# Patient Record
Sex: FEMALE | Race: WHITE | NOT HISPANIC OR LATINO | URBAN - METROPOLITAN AREA
[De-identification: names, ages, dates, MRNs, and addresses within clinical notes are randomized per-mention and may not be internally consistent; named-entity substitution may affect disease eponyms.]

---

## 2017-03-09 ENCOUNTER — EMERGENCY (EMERGENCY)
Facility: HOSPITAL | Age: 72
LOS: 1 days | End: 2017-03-09
Payer: MEDICARE

## 2017-03-09 PROCEDURE — 71010: CPT | Mod: 26

## 2017-03-09 PROCEDURE — 99285 EMERGENCY DEPT VISIT HI MDM: CPT

## 2017-03-15 PROBLEM — Z00.00 ENCOUNTER FOR PREVENTIVE HEALTH EXAMINATION: Status: ACTIVE | Noted: 2017-03-15

## 2017-03-23 ENCOUNTER — APPOINTMENT (OUTPATIENT)
Dept: CARDIOLOGY | Facility: CLINIC | Age: 72
End: 2017-03-23

## 2017-03-30 ENCOUNTER — APPOINTMENT (OUTPATIENT)
Dept: CARDIOLOGY | Facility: CLINIC | Age: 72
End: 2017-03-30

## 2017-04-19 ENCOUNTER — APPOINTMENT (OUTPATIENT)
Dept: CARDIOLOGY | Facility: CLINIC | Age: 72
End: 2017-04-19

## 2017-04-24 ENCOUNTER — INPATIENT (INPATIENT)
Facility: HOSPITAL | Age: 72
LOS: 0 days | Discharge: HOME | DRG: 247 | End: 2017-04-25
Attending: INTERNAL MEDICINE | Admitting: INTERNAL MEDICINE
Payer: MEDICARE

## 2017-04-24 VITALS — SYSTOLIC BLOOD PRESSURE: 168 MMHG | DIASTOLIC BLOOD PRESSURE: 90 MMHG | HEART RATE: 78 BPM

## 2017-04-24 DIAGNOSIS — R94.39 ABNORMAL RESULT OF OTHER CARDIOVASCULAR FUNCTION STUDY: ICD-10-CM

## 2017-04-24 DIAGNOSIS — I25.10 ATHEROSCLEROTIC HEART DISEASE OF NATIVE CORONARY ARTERY WITHOUT ANGINA PECTORIS: ICD-10-CM

## 2017-04-24 PROCEDURE — 93458 L HRT ARTERY/VENTRICLE ANGIO: CPT | Mod: 26,XU

## 2017-04-24 PROCEDURE — 93010 ELECTROCARDIOGRAM REPORT: CPT | Mod: 76

## 2017-04-24 PROCEDURE — 92928 PRQ TCAT PLMT NTRAC ST 1 LES: CPT | Mod: RC

## 2017-04-24 RX ORDER — ASPIRIN/CALCIUM CARB/MAGNESIUM 324 MG
81 TABLET ORAL DAILY
Qty: 0 | Refills: 0 | Status: DISCONTINUED | OUTPATIENT
Start: 2017-04-24 | End: 2017-04-25

## 2017-04-24 RX ORDER — TICAGRELOR 90 MG/1
90 TABLET ORAL
Qty: 0 | Refills: 0 | Status: DISCONTINUED | OUTPATIENT
Start: 2017-04-24 | End: 2017-04-25

## 2017-04-24 RX ORDER — LACTOBACILLUS ACIDOPHILUS 100MM CELL
2 CAPSULE ORAL
Qty: 0 | Refills: 0 | COMMUNITY

## 2017-04-24 RX ORDER — SODIUM CHLORIDE 9 MG/ML
500 INJECTION INTRAMUSCULAR; INTRAVENOUS; SUBCUTANEOUS
Qty: 0 | Refills: 0 | Status: DISCONTINUED | OUTPATIENT
Start: 2017-04-24 | End: 2017-04-24

## 2017-04-24 RX ORDER — SODIUM CHLORIDE 9 MG/ML
1000 INJECTION INTRAMUSCULAR; INTRAVENOUS; SUBCUTANEOUS
Qty: 0 | Refills: 0 | Status: DISCONTINUED | OUTPATIENT
Start: 2017-04-24 | End: 2017-04-25

## 2017-04-24 RX ORDER — ZOLPIDEM TARTRATE 10 MG/1
5 TABLET ORAL AT BEDTIME
Qty: 0 | Refills: 0 | Status: DISCONTINUED | OUTPATIENT
Start: 2017-04-24 | End: 2017-04-25

## 2017-04-24 RX ORDER — ATORVASTATIN CALCIUM 80 MG/1
1 TABLET, FILM COATED ORAL
Qty: 0 | Refills: 0 | COMMUNITY

## 2017-04-24 RX ORDER — ASPIRIN/CALCIUM CARB/MAGNESIUM 324 MG
1 TABLET ORAL
Qty: 0 | Refills: 0 | COMMUNITY

## 2017-04-24 RX ORDER — METOPROLOL TARTRATE 50 MG
1 TABLET ORAL
Qty: 0 | Refills: 0 | COMMUNITY

## 2017-04-24 RX ORDER — CHOLECALCIFEROL (VITAMIN D3) 125 MCG
1 CAPSULE ORAL
Qty: 0 | Refills: 0 | COMMUNITY

## 2017-04-24 RX ORDER — UBIDECARENONE 100 MG
1 CAPSULE ORAL
Qty: 0 | Refills: 0 | COMMUNITY

## 2017-04-24 RX ORDER — ATORVASTATIN CALCIUM 80 MG/1
20 TABLET, FILM COATED ORAL AT BEDTIME
Qty: 0 | Refills: 0 | Status: DISCONTINUED | OUTPATIENT
Start: 2017-04-24 | End: 2017-04-25

## 2017-04-24 RX ORDER — METOPROLOL TARTRATE 50 MG
50 TABLET ORAL DAILY
Qty: 0 | Refills: 0 | Status: DISCONTINUED | OUTPATIENT
Start: 2017-04-24 | End: 2017-04-25

## 2017-04-24 RX ORDER — ACETAMINOPHEN 500 MG
650 TABLET ORAL EVERY 6 HOURS
Qty: 0 | Refills: 0 | Status: DISCONTINUED | OUTPATIENT
Start: 2017-04-24 | End: 2017-04-25

## 2017-04-24 RX ORDER — ALPRAZOLAM 0.25 MG
0.25 TABLET ORAL EVERY 6 HOURS
Qty: 0 | Refills: 0 | Status: DISCONTINUED | OUTPATIENT
Start: 2017-04-24 | End: 2017-04-25

## 2017-04-24 RX ORDER — CLOPIDOGREL BISULFATE 75 MG/1
1 TABLET, FILM COATED ORAL
Qty: 0 | Refills: 0 | COMMUNITY

## 2017-04-24 RX ADMIN — ATORVASTATIN CALCIUM 20 MILLIGRAM(S): 80 TABLET, FILM COATED ORAL at 21:12

## 2017-04-24 RX ADMIN — TICAGRELOR 90 MILLIGRAM(S): 90 TABLET ORAL at 21:13

## 2017-04-24 NOTE — DISCHARGE NOTE ADULT - CARE PROVIDER_API CALL
Sebastian Marrero), Cardiovascular Disease; Interventional Cardiology  [address] [place] [address]  Phone: [phone]  Fax: [phone]

## 2017-04-24 NOTE — PROGRESS NOTE ADULT - PROBLEM SELECTOR PLAN 1
D/C Plavix  Begin Brilinta 90mg po 2x daily  ASA 81 mg po Daily  AM labs/ECG  RRA site care w/instructions to pt  F/U Dr. Marrero outpt.  Aggressive CV risk factor reduction measures reviewed with patient via patient teaching

## 2017-04-24 NOTE — H&P PST ADULT - FAMILY HISTORY
Father  Still living? No  Family history of heart attack, Age at diagnosis: 71-80     Sibling  Still living? Yes, Estimated age: 61-70  Family history of hyperlipidemia, Age at diagnosis: Age Unknown

## 2017-04-24 NOTE — DISCHARGE NOTE ADULT - HOSPITAL COURSE
s/p LHC RRA s/p LHC RRA w/PCI X1 DC mRCA for 99% s/p [facility] RRA w/PCI X1 DC mRCA for 99%    72 y/o female with no cardiac history who had an episode about a month ago where she was not "feeling well" for over a day, checked her bp at home and noted that it was "very high." Pt states she went to a walk-in clinic and was sent to the ER, treated with clonidine and was discharged home. She followed up with PMD who started her on metoprolol and referred her to cardiologist. She had an abnormal stress echo last week which showed normal LV, hypokineses of the lateral, anterior and inferior walls. She was started on plavix 75 mg daily and referred for a cardiac catheterization. Pt denies any chest pain, SOB, dizziness or syncope.     s/p [facility] 4/24/17 with PCI to RCA admitted overnight to tele.  no events on monitor, AM labs OK, AM EKG no changes, Patient denies any complaints overnight.  Stable for discharge home with out patient follow up.

## 2017-04-24 NOTE — DISCHARGE NOTE ADULT - PLAN OF CARE
Restricted use with no heavy lifting of affected arm for 48 hours.  No submerging the arm in water for 48 hours.  You may start showering today.  Call your doctor for any bleeding, swelling, loss of sensation in the hand or fingers, or fingers turning blue.  If heavy bleeding or large lumps form, hold pressure at the spot and come to the Emergency Room.  REMOVE RIGHT RADIAL DRESSING ON 4/25/2017 optimal cardiac function

## 2017-04-24 NOTE — H&P PST ADULT - HISTORY OF PRESENT ILLNESS
72 y/o female with no cardiac history who had an episode about a month ago where she was not "feeling well" for over a day, checked her bp at home and noted that it was "very high." Pt states she went to a walk-in clinic and was sent to the ER, treated with clonidine and was discharged home. She followed up with PMD who started her on metoprolol and referred her to cardiologist. She had an abnormal stress echo last week which showed normal LV, hypokineses of the lateral, anterior and inferior walls. She was started on plavix 75 mg daily and referred for a cardiac catheterization. Pt denies any chest pain, SOB, dizziness or syncope.

## 2017-04-24 NOTE — PROGRESS NOTE ADULT - SUBJECTIVE AND OBJECTIVE BOX
S/P LHC RRA w/PCI mRCA for 99% occlusion  Tolerated procedre well and seen post procedure by Dr. garcia w/spouse present  No complications  Centricity pending    REVIEW OF SYSTEMS:  Denies SOB, CP, NV, HA, dizziness, palpitations, site pain  PHYSICAL EXAM: A&Ox3 NAD Skin warm and dry  NEURO: Speech intact +gag +swallow Tongue midline YORK  NECK: No JVD, trachea midline. Eupneic  HEART: RRR S1S2 no g/m Tele/ECG NSR no ectopy  PULMONARY:  CTA [place]  ABDOMEN: Soft nontender X4 +BS   EXTREMITIES: Rt Radial site: Rt radial pulse + w/pulse ox on right index finger SaO2>95% RUE w/oneurovascular deficit. Capillary refill <3 sec

## 2017-04-24 NOTE — DISCHARGE NOTE ADULT - MEDICATION SUMMARY - MEDICATIONS TO STOP TAKING
I will STOP taking the medications listed below when I get home from the hospital:    clopidogrel 75 mg oral tablet  -- 1 tab(s) by mouth once a day

## 2017-04-24 NOTE — DISCHARGE NOTE ADULT - PATIENT PORTAL LINK FT
“You can access the [facility] Patient Portal, offered by [facility], by registering with the following website: [email or web address]”

## 2017-04-24 NOTE — DISCHARGE NOTE ADULT - MEDICATION SUMMARY - MEDICATIONS TO TAKE
I will START or STAY ON the medications listed below when I get home from the hospital:    Aspir 81 oral delayed release tablet  -- 1 tab(s) by mouth once a day  -- Indication: For CAD (coronary artery disease)    atorvastatin 20 mg oral tablet  -- 1 tab(s) by mouth once a day  -- Indication: For Cholestrol    ticagrelor 90 mg oral tablet  -- 1 tab(s) by mouth 2 times a day  -- Indication: For CAD (coronary artery disease)    metoprolol succinate 50 mg oral tablet, extended release  -- 1 tab(s) by mouth once a day  -- Indication: For CAD (coronary artery disease)    CoQ10 300 mg oral capsule  -- 1 cap(s) by mouth once a day  -- Indication: For supplement    Acidophilus oral tablet  -- 2 tab(s) by mouth once a day  -- Indication: For supplement    B 100 Complex oral tablet  -- 1 tab(s) by mouth once a day  -- Indication: For supplement    multivitamin  -- 1 tab(s) by mouth once a day  -- Indication: For supplement    Vitamin D3 2000 intl units oral tablet  -- 1 tab(s) by mouth once a day  -- Indication: For supplement

## 2017-04-24 NOTE — CHART NOTE - NSCHARTNOTEFT_GEN_A_CORE
Cards ATTG    Hx HTN and Hypercholesteremia.    Pt. w AMANDA mod to severe exertion.  Exercise echocardiography w ischemic ecg response and multiple territories of ischemia at low work load.  Risks/Benefits of Angiography, PCI reviewed, Pt. wished to proceed.    R Radial Allens Normal    LM Irregs  LAD ostial 40 mid 20  LCX 2.25 mm mid 90 with post stenotic dililation  RCA mid 99    LV IW hypo EF 55    RCA:  3.5 x 18 mm Resolute DC to 0 Percent.    R Radial Band w out complication.    Brilinta 180 given.    OK for d/c tomorrow on asa 81 daily and brilinta 90 BID.  See me one week.  Feel that LCX best treated medically.

## 2017-04-25 LAB
ANION GAP SERPL CALC-SCNC: 14 MMOL/L — SIGNIFICANT CHANGE UP (ref 5–17)
BUN SERPL-MCNC: 17 MG/DL — SIGNIFICANT CHANGE UP (ref 8–20)
CALCIUM SERPL-MCNC: 9.3 MG/DL — SIGNIFICANT CHANGE UP (ref 8.6–10.2)
CHLORIDE SERPL-SCNC: 103 MMOL/L — SIGNIFICANT CHANGE UP (ref 98–107)
CO2 SERPL-SCNC: 23 MMOL/L — SIGNIFICANT CHANGE UP (ref 22–29)
CREAT SERPL-MCNC: 0.61 MG/DL — SIGNIFICANT CHANGE UP (ref 0.5–1.3)
GLUCOSE SERPL-MCNC: 98 MG/DL — SIGNIFICANT CHANGE UP (ref 70–115)
HCT VFR BLD CALC: 40.8 % — SIGNIFICANT CHANGE UP (ref 37–47)
HGB BLD-MCNC: 14 G/DL — SIGNIFICANT CHANGE UP (ref 12–16)
MAGNESIUM SERPL-MCNC: 2.1 MG/DL — SIGNIFICANT CHANGE UP (ref 1.8–2.5)
MCHC RBC-ENTMCNC: 29.6 PG — SIGNIFICANT CHANGE UP (ref 27–31)
MCHC RBC-ENTMCNC: 34.3 G/DL — SIGNIFICANT CHANGE UP (ref 32–36)
MCV RBC AUTO: 86.3 FL — SIGNIFICANT CHANGE UP (ref 81–99)
PLATELET # BLD AUTO: 348 K/UL — SIGNIFICANT CHANGE UP (ref 150–400)
POTASSIUM SERPL-MCNC: 3.6 MMOL/L — SIGNIFICANT CHANGE UP (ref 3.5–5.3)
POTASSIUM SERPL-SCNC: 3.6 MMOL/L — SIGNIFICANT CHANGE UP (ref 3.5–5.3)
RBC # BLD: 4.73 M/UL — SIGNIFICANT CHANGE UP (ref 4.4–5.2)
RBC # FLD: 13.5 % — SIGNIFICANT CHANGE UP (ref 11–15.6)
SODIUM SERPL-SCNC: 140 MMOL/L — SIGNIFICANT CHANGE UP (ref 135–145)
WBC # BLD: 8.2 K/UL — SIGNIFICANT CHANGE UP (ref 4.8–10.8)
WBC # FLD AUTO: 8.2 K/UL — SIGNIFICANT CHANGE UP (ref 4.8–10.8)

## 2017-04-25 PROCEDURE — C9600: CPT | Mod: RC

## 2017-04-25 PROCEDURE — C1894: CPT

## 2017-04-25 PROCEDURE — 80048 BASIC METABOLIC PNL TOTAL CA: CPT

## 2017-04-25 PROCEDURE — 83735 ASSAY OF MAGNESIUM: CPT

## 2017-04-25 PROCEDURE — C1874: CPT

## 2017-04-25 PROCEDURE — 93010 ELECTROCARDIOGRAM REPORT: CPT

## 2017-04-25 PROCEDURE — 36415 COLL VENOUS BLD VENIPUNCTURE: CPT

## 2017-04-25 PROCEDURE — C1769: CPT

## 2017-04-25 PROCEDURE — C1887: CPT

## 2017-04-25 PROCEDURE — 85027 COMPLETE CBC AUTOMATED: CPT

## 2017-04-25 PROCEDURE — 93005 ELECTROCARDIOGRAM TRACING: CPT

## 2017-04-25 PROCEDURE — 93458 L HRT ARTERY/VENTRICLE ANGIO: CPT | Mod: XU

## 2017-04-25 RX ORDER — TICAGRELOR 90 MG/1
1 TABLET ORAL
Qty: 60 | Refills: 5 | OUTPATIENT
Start: 2017-04-25 | End: 2017-10-21

## 2017-04-25 RX ADMIN — Medication 50 MILLIGRAM(S): at 05:12

## 2017-04-25 RX ADMIN — TICAGRELOR 90 MILLIGRAM(S): 90 TABLET ORAL at 05:12

## 2017-04-25 NOTE — PROGRESS NOTE ADULT - SUBJECTIVE AND OBJECTIVE BOX
CC:s/p cardiac cath    HPI:89y/o s/p cardiac cath 4/24/7 Dr marrero with PCI to RCA see cath report.  Patient denies any complaints this morning looking forward to going home.    PAST MEDICAL & SURGICAL HISTORY:  Belching symptom  Knee pain  HLD (hyperlipidemia)  HTN (hypertension)  No significant past surgical history      REVIEW OF SYSTEMS:    CONSTITUTIONAL: No fever, weight loss, or fatigue  EYES: No eye pain, visual disturbances, or discharge  ENMT:  No difficulty hearing, tinnitus, vertigo; No sinus or throat pain  NECK: No pain or stiffness  RESPIRATORY: No cough, wheezing, chills or hemoptysis; No shortness of breath  CARDIOVASCULAR: No chest pain, palpitations, dizziness, or leg swelling  GASTROINTESTINAL: No abdominal or epigastric pain. No nausea, vomiting, or hematemesis; No diarrhea or constipation. No melena or hematochezia.  NEUROLOGICAL: No headaches, memory loss, loss of strength, numbness, or tremors  SKIN: No itching, burning, rashes, or lesions   LYMPH NODES: No enlarged glands  ENDOCRINE: No heat or cold intolerance; No hair loss  ALLERY AND IMMUNOLOGIC: No hives or eczema      Allergies    No Known Allergies    Intolerances        MEDICATIONS  (STANDING):  ticagrelor 90milliGRAM(s) Oral two times a day  aspirin  chewable 81milliGRAM(s) Oral daily  atorvastatin 20milliGRAM(s) Oral at bedtime  metoprolol succinate ER 50milliGRAM(s) Oral daily  sodium chloride 0.9%. 1000milliLiter(s) IV Continuous <Continuous>    MEDICATIONS  (PRN):  aluminum hydroxide/magnesium hydroxide/simethicone Suspension 30milliLiter(s) Oral every 4 hours PRN Dyspepsia  acetaminophen   Tablet. 650milliGRAM(s) Oral every 6 hours PRN Mild Pain (1 - 3)  ALPRAZolam 0.25milliGRAM(s) Oral every 6 hours PRN anxiety  zolpidem 5milliGRAM(s) Oral at bedtime PRN Insomnia      Vital Signs Last 24 Hrs  T(C): 36.9, Max: 37.2 (04-24 @ 21:11)  T(F): 98.5, Max: 99 (04-24 @ 21:11)  HR: 74 (68 - 79)  BP: 134/82 (125/78 - 168/90)  BP(mean): 116 (116 - 116)  RR: 18 (16 - 20)  SpO2: 95% (95% - 100%)    PHYSICAL EXAM:    GENERAL: NAD, well-groomed, well-developed  HEAD:  Atraumatic, Normocephalic  EYES: EOMI, PERRLA, conjunctiva and sclera clear  ENMT: No tonsillar erythema, exudates, or enlargement; Moist mucous membranes, Good dentition, No lesions  NECK: Supple, No JVD, Normal thyroid  NERVOUS SYSTEM:  Alert & Oriented X3, Good concentration; Motor Strength 5/5 B/L upper and lower extremities; DTRs 2+ intact and symmetric  CHEST/LUNG: Clear to percussion bilaterally; No rales, rhonchi, wheezing, or rubs  HEART: Regular rate and rhythm; No murmurs, rubs, or gallops  ABDOMEN: Soft, Nontender, Nondistended; Bowel sounds present  EXTREMITIES:  2+ Peripheral Pulses, No clubbing, cyanosis, or edema  LYMPH: No lymphadenopathy noted  SKIN: No rashes or lesions          Assessment: s/p cardiac cath with PCI to RCA see official report    Plan Discharge home with out patient follow up with Dr Marrero

## 2017-04-25 NOTE — PROGRESS NOTE ADULT - SUBJECTIVE AND OBJECTIVE BOX
Cardiology PA note S/P cardiac cath with PCI 4/24/17 Dr Marrero see cath report    Right radial site dressing removed, site clean non-tender, no hematoma, no swelling, no bleeding, good hemostasis. Good neuro function, good cap refill, skin warm moist.  Site care reviewed with patient.  Over night alarms reviewed,  AM EKG reviewed,  AM labs drawn pending results.  Reviewed the critical need to c/w all meds as prescribed and not to stop without speaking to Dr Marrero first.  Life style changes and diet reviewed.     A: CAD s/p Cardiac cath with PCI RCA  P: Discharge home with f/u Dr Marrero Cardiology PA note S/P cardiac cath with PCI 4/24/17 Dr Marrero see cath report    Right radial site dressing removed, site clean non-tender, no hematoma, no swelling, no bleeding, good hemostasis. Good neuro function, good cap refill, skin warm moist.  Site care reviewed with patient.  Over night alarms reviewed,  AM EKG reviewed,  AM labs drawn pending results. Results reviewed OK.  Reviewed the critical need to c/w all meds as prescribed and not to stop without speaking to Dr Marrero first.  Life style changes and diet reviewed.     A: CAD s/p Cardiac cath with PCI RCA  P: Discharge home with f/u Dr Marrero

## 2017-04-26 VITALS — WEIGHT: 149.91 LBS

## 2017-05-02 ENCOUNTER — APPOINTMENT (OUTPATIENT)
Dept: CARDIOLOGY | Facility: CLINIC | Age: 72
End: 2017-05-02

## 2017-05-03 PROBLEM — E78.5 HYPERLIPIDEMIA, UNSPECIFIED: Chronic | Status: ACTIVE | Noted: 2017-04-24

## 2017-05-03 PROBLEM — I10 ESSENTIAL (PRIMARY) HYPERTENSION: Chronic | Status: ACTIVE | Noted: 2017-04-24

## 2017-05-03 PROBLEM — R14.2 ERUCTATION: Chronic | Status: ACTIVE | Noted: 2017-04-24

## 2017-05-03 PROBLEM — M25.569 PAIN IN UNSPECIFIED KNEE: Chronic | Status: ACTIVE | Noted: 2017-04-24

## 2017-05-04 NOTE — ASU PATIENT PROFILE, ADULT - TEACHING/LEARNING LEARNING PREFERENCES
Oral Medication (indication, name, dose, time) pictorial/skill demonstration/audio/video/group instruction/written material/individual instruction/verbal instruction/computer/internet

## 2017-11-07 ENCOUNTER — APPOINTMENT (OUTPATIENT)
Dept: CARDIOLOGY | Facility: CLINIC | Age: 72
End: 2017-11-07
Payer: MEDICARE

## 2017-11-07 PROCEDURE — 99214 OFFICE O/P EST MOD 30 MIN: CPT

## 2017-11-13 ENCOUNTER — RX RENEWAL (OUTPATIENT)
Age: 72
End: 2017-11-13

## 2017-11-15 ENCOUNTER — RX RENEWAL (OUTPATIENT)
Age: 72
End: 2017-11-15

## 2017-11-21 ENCOUNTER — MEDICATION RENEWAL (OUTPATIENT)
Age: 72
End: 2017-11-21

## 2018-01-10 NOTE — DISCHARGE NOTE ADULT - NS AS ACTIVITY OBS
No Heavy lifting/straining/Do not drive or operate machinery/Showering allowed/Do not make important decisions
balance training/bed mobility training/strengthening/transfer training/gait training

## 2018-02-12 ENCOUNTER — RX RENEWAL (OUTPATIENT)
Age: 73
End: 2018-02-12

## 2018-05-16 ENCOUNTER — MEDICATION RENEWAL (OUTPATIENT)
Age: 73
End: 2018-05-16

## 2018-05-17 ENCOUNTER — MEDICATION RENEWAL (OUTPATIENT)
Age: 73
End: 2018-05-17

## 2018-11-05 ENCOUNTER — RX RENEWAL (OUTPATIENT)
Age: 73
End: 2018-11-05

## 2018-11-06 ENCOUNTER — RX RENEWAL (OUTPATIENT)
Age: 73
End: 2018-11-06

## 2018-11-30 ENCOUNTER — APPOINTMENT (OUTPATIENT)
Dept: CARDIOLOGY | Facility: CLINIC | Age: 73
End: 2018-11-30
Payer: MEDICARE

## 2018-11-30 ENCOUNTER — NON-APPOINTMENT (OUTPATIENT)
Age: 73
End: 2018-11-30

## 2018-11-30 ENCOUNTER — RECORD ABSTRACTING (OUTPATIENT)
Age: 73
End: 2018-11-30

## 2018-11-30 VITALS
SYSTOLIC BLOOD PRESSURE: 146 MMHG | DIASTOLIC BLOOD PRESSURE: 70 MMHG | HEIGHT: 66 IN | WEIGHT: 150 LBS | HEART RATE: 55 BPM | BODY MASS INDEX: 24.11 KG/M2

## 2018-11-30 DIAGNOSIS — Z82.49 FAMILY HISTORY OF ISCHEMIC HEART DISEASE AND OTHER DISEASES OF THE CIRCULATORY SYSTEM: ICD-10-CM

## 2018-11-30 DIAGNOSIS — Z78.9 OTHER SPECIFIED HEALTH STATUS: ICD-10-CM

## 2018-11-30 DIAGNOSIS — Z87.891 PERSONAL HISTORY OF NICOTINE DEPENDENCE: ICD-10-CM

## 2018-11-30 DIAGNOSIS — K21.9 GASTRO-ESOPHAGEAL REFLUX DISEASE W/OUT ESOPHAGITIS: ICD-10-CM

## 2018-11-30 DIAGNOSIS — Z98.890 OTHER SPECIFIED POSTPROCEDURAL STATES: ICD-10-CM

## 2018-11-30 PROCEDURE — 99214 OFFICE O/P EST MOD 30 MIN: CPT

## 2018-11-30 PROCEDURE — 93000 ELECTROCARDIOGRAM COMPLETE: CPT

## 2018-11-30 RX ORDER — ASPIRIN 81 MG/1
81 TABLET ORAL DAILY
Qty: 90 | Refills: 0 | Status: ACTIVE | COMMUNITY
Start: 2018-11-30

## 2018-11-30 NOTE — ASSESSMENT
[FreeTextEntry1] : CAD: The patient is status post coronary stenting. Patient has good exercise ability without exertional symptoms.\par \par Hypertension: cont present meds\par \par Hyperlipidemia: The patient has slightly elevated transaminases. Overall we elected to reduce the Lipitor 20 mg daily. Repeat LFTs will be arranged for 3 weeks' time.

## 2018-11-30 NOTE — PHYSICAL EXAM
[General Appearance - Well Developed] : well developed [Normal Appearance] : normal appearance [Well Groomed] : well groomed [General Appearance - Well Nourished] : well nourished [No Deformities] : no deformities [General Appearance - In No Acute Distress] : no acute distress [Normal Conjunctiva] : the conjunctiva exhibited no abnormalities [Eyelids - No Xanthelasma] : the eyelids demonstrated no xanthelasmas [Normal Oral Mucosa] : normal oral mucosa [No Oral Pallor] : no oral pallor [No Oral Cyanosis] : no oral cyanosis [Normal Jugular Venous A Waves Present] : normal jugular venous A waves present [Normal Jugular Venous V Waves Present] : normal jugular venous V waves present [No Jugular Venous Baker A Waves] : no jugular venous baker A waves [] : no respiratory distress [Respiration, Rhythm And Depth] : normal respiratory rhythm and effort [Exaggerated Use Of Accessory Muscles For Inspiration] : no accessory muscle use [Auscultation Breath Sounds / Voice Sounds] : lungs were clear to auscultation bilaterally [Heart Rate And Rhythm] : heart rate and rhythm were normal [Heart Sounds] : normal S1 and S2 [Murmurs] : no murmurs present

## 2018-11-30 NOTE — DISCUSSION/SUMMARY
[Optimized for Surgery] : the patient is optimized for surgery [FreeTextEntry1] : Need for dental extraction:\par \par Pre Operative Issues:\par \par The patient is maximized for the planned procedure.\par \par Proceed without delay.\par \par Keep input equal to output.\par \par Standard DVT prophylaxis is recommended.\par \par Hold for brilinta for 5 days preoperatively and resume on postop day #1.\par \par Continue aspirin through the procedure without interruption because of a history of coronary stenting\par \par

## 2018-11-30 NOTE — HISTORY OF PRESENT ILLNESS
[Scheduled Procedure ___] : a [unfilled] [FreeTextEntry1] : CAD: The patient is status post coronary stenting. Patient has good exercise ability without exertional symptoms.\par \par Hypertension: cont present meds\par \par \par Hyperlipidemia: The patient has slightly elevated transaminases. Overall we elected to reduce the Lipitor 20 mg daily. Repeat LFTs will be arranged for 3 weeks' time.

## 2019-01-15 NOTE — DISCHARGE NOTE ADULT - MEDICATION SUMMARY - MEDICATIONS TO CHANGE
done I will SWITCH the dose or number of times a day I take the medications listed below when I get home from the hospital:  None

## 2019-03-06 ENCOUNTER — APPOINTMENT (OUTPATIENT)
Dept: CARDIOLOGY | Facility: CLINIC | Age: 74
End: 2019-03-06
Payer: MEDICARE

## 2019-03-06 VITALS
BODY MASS INDEX: 25.07 KG/M2 | DIASTOLIC BLOOD PRESSURE: 72 MMHG | HEART RATE: 78 BPM | SYSTOLIC BLOOD PRESSURE: 138 MMHG | OXYGEN SATURATION: 97 % | HEIGHT: 66 IN | WEIGHT: 156 LBS

## 2019-03-06 PROCEDURE — 99214 OFFICE O/P EST MOD 30 MIN: CPT

## 2019-03-06 NOTE — ASSESSMENT
[FreeTextEntry1] : CAD: Patient status post coronary stenting. The patient has excellent exercise ability without exertional symptoms. The risks and benefits of long-term dual antiplatelet therapy have been reviewed.  Pt. wishes to cont asa/brlinta.\par \par HTN:  well controlled\par \par Chol:  higher dose lipitor cause transaminitis.  Cont lipitor 20 q d.

## 2019-05-06 ENCOUNTER — RX RENEWAL (OUTPATIENT)
Age: 74
End: 2019-05-06

## 2019-05-07 ENCOUNTER — MEDICATION RENEWAL (OUTPATIENT)
Age: 74
End: 2019-05-07

## 2019-06-03 ENCOUNTER — MEDICATION RENEWAL (OUTPATIENT)
Age: 74
End: 2019-06-03

## 2019-06-04 ENCOUNTER — RX RENEWAL (OUTPATIENT)
Age: 74
End: 2019-06-04

## 2019-08-05 ENCOUNTER — MEDICATION RENEWAL (OUTPATIENT)
Age: 74
End: 2019-08-05

## 2019-08-26 ENCOUNTER — MEDICATION RENEWAL (OUTPATIENT)
Age: 74
End: 2019-08-26

## 2019-11-04 ENCOUNTER — TRANSCRIPTION ENCOUNTER (OUTPATIENT)
Age: 74
End: 2019-11-04

## 2019-11-04 ENCOUNTER — INPATIENT (INPATIENT)
Facility: HOSPITAL | Age: 74
LOS: 2 days | Discharge: NURSING, REHAB OR LONG-TERM CARE | End: 2019-11-07
Payer: MEDICARE

## 2019-11-04 ENCOUNTER — OUTPATIENT (OUTPATIENT)
Dept: OUTPATIENT SERVICES | Facility: HOSPITAL | Age: 74
LOS: 1 days | End: 2019-11-04

## 2019-11-04 PROCEDURE — 70450 CT HEAD/BRAIN W/O DYE: CPT | Mod: 26

## 2019-11-04 PROCEDURE — 71045 X-RAY EXAM CHEST 1 VIEW: CPT | Mod: 26

## 2019-11-04 PROCEDURE — 99285 EMERGENCY DEPT VISIT HI MDM: CPT

## 2019-11-04 PROCEDURE — 73552 X-RAY EXAM OF FEMUR 2/>: CPT | Mod: 26,RT

## 2019-11-04 PROCEDURE — 73502 X-RAY EXAM HIP UNI 2-3 VIEWS: CPT | Mod: 26,RT

## 2019-11-05 PROCEDURE — 93010 ELECTROCARDIOGRAM REPORT: CPT

## 2019-11-05 PROCEDURE — 99223 1ST HOSP IP/OBS HIGH 75: CPT

## 2019-11-06 ENCOUNTER — OUTPATIENT (OUTPATIENT)
Dept: OUTPATIENT SERVICES | Facility: HOSPITAL | Age: 74
LOS: 1 days | End: 2019-11-06

## 2019-11-06 PROCEDURE — 99232 SBSQ HOSP IP/OBS MODERATE 35: CPT

## 2019-11-07 ENCOUNTER — OUTPATIENT (OUTPATIENT)
Dept: OUTPATIENT SERVICES | Facility: HOSPITAL | Age: 74
LOS: 1 days | End: 2019-11-07

## 2019-11-08 ENCOUNTER — OUTPATIENT (OUTPATIENT)
Dept: OUTPATIENT SERVICES | Facility: HOSPITAL | Age: 74
LOS: 1 days | End: 2019-11-08

## 2019-11-15 ENCOUNTER — OUTPATIENT (OUTPATIENT)
Dept: OUTPATIENT SERVICES | Facility: HOSPITAL | Age: 74
LOS: 1 days | End: 2019-11-15

## 2019-11-22 ENCOUNTER — OUTPATIENT (OUTPATIENT)
Dept: OUTPATIENT SERVICES | Facility: HOSPITAL | Age: 74
LOS: 1 days | End: 2019-11-22

## 2020-01-27 RX ORDER — TICAGRELOR 90 MG/1
90 TABLET ORAL TWICE DAILY
Qty: 60 | Refills: 0 | Status: DISCONTINUED | COMMUNITY
Start: 2017-11-15 | End: 2020-01-27

## 2020-02-25 DIAGNOSIS — E78.00 PURE HYPERCHOLESTEROLEMIA, UNSPECIFIED: ICD-10-CM

## 2020-03-12 ENCOUNTER — APPOINTMENT (OUTPATIENT)
Dept: CARDIOLOGY | Facility: CLINIC | Age: 75
End: 2020-03-12
Payer: MEDICARE

## 2020-03-12 VITALS
HEART RATE: 88 BPM | DIASTOLIC BLOOD PRESSURE: 98 MMHG | HEIGHT: 66 IN | RESPIRATION RATE: 17 BRPM | BODY MASS INDEX: 23.46 KG/M2 | WEIGHT: 146 LBS | OXYGEN SATURATION: 98 % | SYSTOLIC BLOOD PRESSURE: 168 MMHG

## 2020-03-12 PROCEDURE — 99214 OFFICE O/P EST MOD 30 MIN: CPT

## 2020-03-12 RX ORDER — ATORVASTATIN CALCIUM 40 MG/1
40 TABLET, FILM COATED ORAL
Qty: 90 | Refills: 3 | Status: DISCONTINUED | COMMUNITY
Start: 2018-02-12 | End: 2020-03-12

## 2020-03-12 NOTE — ASSESSMENT
[FreeTextEntry1] : CAD: The patient is status post coronary intervention.  The patient has good exercise ability without exertional symptoms.  Unfortunately she recently broke her leg which is caused her to decrease her physical activity dramatically.  She is in the process of rehabilitation.\par \par Hypertension: The patient takes her blood pressure at home very frequently.  She is always normotensive at home.\par \par Hyperlipidemia: The patient had slightly elevated transaminases in the past.  Repeat cholesterol profile is pending.  The patient states she is still on her home portal that her cholesterol has increased.  We will consider another attempted up titration of pharmacologic therapy.

## 2020-03-25 RX ORDER — TICAGRELOR 90 MG/1
90 TABLET ORAL
Qty: 180 | Refills: 0 | Status: DISCONTINUED | COMMUNITY
Start: 2018-11-06 | End: 2020-03-25

## 2020-04-20 ENCOUNTER — RX RENEWAL (OUTPATIENT)
Age: 75
End: 2020-04-20

## 2020-09-07 ENCOUNTER — RX RENEWAL (OUTPATIENT)
Age: 75
End: 2020-09-07

## 2020-09-08 ENCOUNTER — APPOINTMENT (OUTPATIENT)
Dept: CARDIOLOGY | Facility: CLINIC | Age: 75
End: 2020-09-08
Payer: MEDICARE

## 2020-09-08 VITALS
WEIGHT: 150 LBS | BODY MASS INDEX: 24.11 KG/M2 | OXYGEN SATURATION: 99 % | HEIGHT: 66 IN | SYSTOLIC BLOOD PRESSURE: 182 MMHG | HEART RATE: 70 BPM | DIASTOLIC BLOOD PRESSURE: 90 MMHG | TEMPERATURE: 97.5 F

## 2020-09-08 DIAGNOSIS — E78.5 HYPERLIPIDEMIA, UNSPECIFIED: ICD-10-CM

## 2020-09-08 PROCEDURE — 93000 ELECTROCARDIOGRAM COMPLETE: CPT

## 2020-09-08 PROCEDURE — 99214 OFFICE O/P EST MOD 30 MIN: CPT

## 2020-09-08 NOTE — HISTORY OF PRESENT ILLNESS
[FreeTextEntry1] : CAD: Patient status post coronary intervention.  Her leg is healed well.  The patient has excellent exercise ability without exertional symptoms.\par \par Hyperlipidemia: LDL is under 100.  Elected to not uptitrate pharmacologic therapy because of a history of transaminitis.\par \par Hypertension: The patient is always hypertensive in this office and always normotensive at home.  Elected to continue current therapy.

## 2020-09-08 NOTE — PHYSICAL EXAM
[Normal Appearance] : normal appearance [General Appearance - Well Developed] : well developed [General Appearance - Well Nourished] : well nourished [Well Groomed] : well groomed [No Deformities] : no deformities [General Appearance - In No Acute Distress] : no acute distress [Eyelids - No Xanthelasma] : the eyelids demonstrated no xanthelasmas [Normal Conjunctiva] : the conjunctiva exhibited no abnormalities [No Oral Pallor] : no oral pallor [Normal Oral Mucosa] : normal oral mucosa [Normal Jugular Venous A Waves Present] : normal jugular venous A waves present [No Oral Cyanosis] : no oral cyanosis [Normal Jugular Venous V Waves Present] : normal jugular venous V waves present [] : no respiratory distress [No Jugular Venous Baker A Waves] : no jugular venous baker A waves [Respiration, Rhythm And Depth] : normal respiratory rhythm and effort [Auscultation Breath Sounds / Voice Sounds] : lungs were clear to auscultation bilaterally [Exaggerated Use Of Accessory Muscles For Inspiration] : no accessory muscle use [Heart Rate And Rhythm] : heart rate and rhythm were normal [Murmurs] : no murmurs present [Heart Sounds] : normal S1 and S2

## 2021-01-29 ENCOUNTER — NON-APPOINTMENT (OUTPATIENT)
Age: 76
End: 2021-01-29

## 2021-01-31 ENCOUNTER — NON-APPOINTMENT (OUTPATIENT)
Age: 76
End: 2021-01-31

## 2021-02-01 ENCOUNTER — APPOINTMENT (OUTPATIENT)
Dept: CARDIOLOGY | Facility: CLINIC | Age: 76
End: 2021-02-01

## 2021-02-02 ENCOUNTER — EMERGENCY (EMERGENCY)
Facility: HOSPITAL | Age: 76
LOS: 1 days | End: 2021-02-02
Admitting: EMERGENCY MEDICINE
Payer: MEDICARE

## 2021-02-02 PROCEDURE — 99285 EMERGENCY DEPT VISIT HI MDM: CPT

## 2021-02-03 ENCOUNTER — APPOINTMENT (OUTPATIENT)
Dept: CARDIOLOGY | Facility: CLINIC | Age: 76
End: 2021-02-03
Payer: MEDICARE

## 2021-02-03 VITALS
OXYGEN SATURATION: 95 % | DIASTOLIC BLOOD PRESSURE: 108 MMHG | HEART RATE: 73 BPM | TEMPERATURE: 97.3 F | SYSTOLIC BLOOD PRESSURE: 166 MMHG | HEIGHT: 66 IN | WEIGHT: 145 LBS | BODY MASS INDEX: 23.3 KG/M2

## 2021-02-03 PROCEDURE — 70450 CT HEAD/BRAIN W/O DYE: CPT | Mod: 26

## 2021-02-03 PROCEDURE — 71045 X-RAY EXAM CHEST 1 VIEW: CPT | Mod: 26

## 2021-02-03 PROCEDURE — 93010 ELECTROCARDIOGRAM REPORT: CPT

## 2021-02-03 PROCEDURE — 99214 OFFICE O/P EST MOD 30 MIN: CPT

## 2021-02-03 PROCEDURE — 99072 ADDL SUPL MATRL&STAF TM PHE: CPT

## 2021-02-03 NOTE — HISTORY OF PRESENT ILLNESS
[FreeTextEntry1] : Hypertension: The patient is persistently hypertensive.  She also is extremely anxious.  Thyroid function testing has been arranged.  The patient started on Norvasc 5 mg daily approximately 2 days ago.  Today I increase her metoprolol succinate from 50 mg daily to 100 mg daily.\par \par CAD: Patient underwent coronary stenting in the past.  The patient has good exercise ability without exertional symptoms.  Patient has residual disease in LCX  which is being treated medically.

## 2021-02-17 ENCOUNTER — APPOINTMENT (OUTPATIENT)
Dept: CARDIOLOGY | Facility: CLINIC | Age: 76
End: 2021-02-17
Payer: MEDICARE

## 2021-02-17 VITALS
HEART RATE: 72 BPM | SYSTOLIC BLOOD PRESSURE: 166 MMHG | WEIGHT: 155 LBS | OXYGEN SATURATION: 96 % | TEMPERATURE: 97.3 F | DIASTOLIC BLOOD PRESSURE: 104 MMHG | BODY MASS INDEX: 24.91 KG/M2 | HEIGHT: 66 IN

## 2021-02-17 PROCEDURE — 99072 ADDL SUPL MATRL&STAF TM PHE: CPT

## 2021-02-17 PROCEDURE — 99214 OFFICE O/P EST MOD 30 MIN: CPT

## 2021-02-17 NOTE — ASSESSMENT
[FreeTextEntry1] : CAD: Continue aspirin therapy.  The patient is asymptomatic.\par \par Hypertension: Well-controlled at home.  The patient is extremely anxious.  Thyroid function testing is normal.  Continued observation is her best option.  She has whitecoat hypertension.\par \par Hyperlipidemia: The patient wishes to continue current medications.

## 2021-02-17 NOTE — HISTORY OF PRESENT ILLNESS
[FreeTextEntry1] : The patient has a history of coronary stenting.  Left circumflex also had disease and is being managed medically.  The patient walks for half an hour without exertional chest discomfort or shortness of breath.  The patient takes her blood pressure at home and she is normotensive at home.

## 2021-03-02 ENCOUNTER — RX RENEWAL (OUTPATIENT)
Age: 76
End: 2021-03-02

## 2021-05-14 NOTE — DISCHARGE NOTE ADULT - CARE PLAN
Goal:	optimal cardiac function  Instructions for follow-up, activity and diet:	Restricted use with no heavy lifting of affected arm for 48 hours.  No submerging the arm in water for 48 hours.  You may start showering today.  Call your doctor for any bleeding, swelling, loss of sensation in the hand or fingers, or fingers turning blue.  If heavy bleeding or large lumps form, hold pressure at the spot and come to the Emergency Room.  REMOVE RIGHT RADIAL DRESSING ON 4/25/2017 14-May-2021 06:30 Principal Discharge DX:	Coronary artery disease with other form of angina pectoris  Goal:	optimal cardiac function  Instructions for follow-up, activity and diet:	Restricted use with no heavy lifting of affected arm for 48 hours.  No submerging the arm in water for 48 hours.  You may start showering today.  Call your doctor for any bleeding, swelling, loss of sensation in the hand or fingers, or fingers turning blue.  If heavy bleeding or large lumps form, hold pressure at the spot and come to the Emergency Room.  REMOVE RIGHT RADIAL DRESSING ON 4/25/2017

## 2021-05-18 ENCOUNTER — NON-APPOINTMENT (OUTPATIENT)
Age: 76
End: 2021-05-18

## 2021-08-31 ENCOUNTER — APPOINTMENT (OUTPATIENT)
Dept: CARDIOLOGY | Facility: CLINIC | Age: 76
End: 2021-08-31

## 2021-09-23 ENCOUNTER — APPOINTMENT (OUTPATIENT)
Dept: CARDIOLOGY | Facility: CLINIC | Age: 76
End: 2021-09-23
Payer: MEDICARE

## 2021-09-23 VITALS
HEART RATE: 75 BPM | DIASTOLIC BLOOD PRESSURE: 88 MMHG | SYSTOLIC BLOOD PRESSURE: 170 MMHG | WEIGHT: 150 LBS | HEIGHT: 66 IN | BODY MASS INDEX: 24.11 KG/M2 | OXYGEN SATURATION: 97 % | TEMPERATURE: 97.3 F

## 2021-09-23 PROCEDURE — 99214 OFFICE O/P EST MOD 30 MIN: CPT

## 2021-09-23 NOTE — ASSESSMENT
[FreeTextEntry1] : CAD: Patient status post coronary stenting. The patient Salima excellent exercise capacity without exertional symptoms. The patient has a lesion in the small circumflex branch. There is poststenotic dilatation. The patient is totally asymptomatic. Overall feel that medical therapy is her best option. The case was done at [facility].\par \par Hyperlipidemia: Repeat LFTs and cholesterol profile on increased dose statin have been arranged.\par \par Hypertension: Patient states she is normotensive at home. She is very nervous when she is at the doctor's office. Continued observation is her best option.

## 2021-09-23 NOTE — HISTORY OF PRESENT ILLNESS
[FreeTextEntry1] : CAD: Patient status post coronary stenting. The patient Salima excellent exercise capacity without exertional symptoms. The patient has a lesion in the small circumflex branch. There is poststenotic dilatation. The patient is totally asymptomatic. Overall feel that medical therapy is her best option. The case was done at [facility].\par \par Hyperlipidemia: Repeat LFTs and cholesterol profile on increased dose statin have been arranged.\par \par Hypertension: Patient states she is normotensive at home. She is very nervous when she is at the doctor's office. Continued observation is her best option.

## 2021-12-27 ENCOUNTER — TRANSCRIPTION ENCOUNTER (OUTPATIENT)
Age: 76
End: 2021-12-27

## 2022-01-17 ENCOUNTER — RX RENEWAL (OUTPATIENT)
Age: 77
End: 2022-01-17

## 2022-04-04 ENCOUNTER — APPOINTMENT (OUTPATIENT)
Dept: ORTHOPEDIC SURGERY | Facility: CLINIC | Age: 77
End: 2022-04-04
Payer: MEDICARE

## 2022-04-04 DIAGNOSIS — S72.351A DISPLACED COMMINUTED FRACTURE OF SHAFT OF RIGHT FEMUR, INITIAL ENCOUNTER FOR CLOSED FRACTURE: ICD-10-CM

## 2022-04-04 DIAGNOSIS — Z95.828 PRESENCE OF OTHER VASCULAR IMPLANTS AND GRAFTS: ICD-10-CM

## 2022-04-04 DIAGNOSIS — I10 ESSENTIAL (PRIMARY) HYPERTENSION: ICD-10-CM

## 2022-04-04 DIAGNOSIS — Z86.39 PERSONAL HISTORY OF OTHER ENDOCRINE, NUTRITIONAL AND METABOLIC DISEASE: ICD-10-CM

## 2022-04-04 PROCEDURE — 20610 DRAIN/INJ JOINT/BURSA W/O US: CPT | Mod: LT

## 2022-04-04 PROCEDURE — 73562 X-RAY EXAM OF KNEE 3: CPT | Mod: LT

## 2022-04-04 PROCEDURE — 99203 OFFICE O/P NEW LOW 30 MIN: CPT | Mod: 25

## 2022-04-04 NOTE — DISCUSSION/SUMMARY
[de-identified] : Options were discussed.  Patient needs TKA's.  She is considering lubricant injections or cortisone injection. She understands if she has CSI she will need to delay TKA for 4 mos.\par She has some tibiofemoral subluxation Left knee on xrays. \par She wants to try CSI LEFT KNEE

## 2022-04-04 NOTE — REASON FOR VISIT
[FreeTextEntry2] : Patient c/o b/l knee Left greater than Right knee pain.  Patient notes pain anterior aspect of the knees.  She had Right femur frx 2 yrs ago and had surgery. She was nursing the Right LE and putting more weight onto the LLE.

## 2022-04-04 NOTE — HISTORY OF PRESENT ILLNESS
[7] : 7 [de-identified] : Patient states bilateral knees have been bothering her for the past year. Patient denies any traumatic injury to the left knee but fell 2 yrs ago and injured the right femur. SHe states she fractured the femur and had an ORIF of the right femur frx. Denies popping and clicking. Using stairs has become difficult.

## 2022-04-04 NOTE — PROCEDURE
[Large Joint Injection] : Large joint injection [Left] : of the left [Knee] : knee [Pain] : pain [Inflammation] : inflammation [X-ray evidence of Osteoarthritis on this or prior visit] : x-ray evidence of Osteoarthritis on this or prior visit [Betadine] : betadine [Sterile technique used] : sterile technique used [___ cc    1%] : Lidocaine ~Vcc of 1%  [Call if redness, pain or fever occur] : call if redness, pain or fever occur [Apply ice for 15min out of every hour for the next 12-24 hours as tolerated] : apply ice for 15 minutes out of every hour for the next 12-24 hours as tolerated [Patient had decreased mobility in the joint] : patient had decreased mobility in the joint [Risks, benefits, alternatives discussed / Verbal consent obtained] : the risks benefits, and alternatives have been discussed, and verbal consent was obtained [___ cc    6mg] :  Betamethasone (Celestone) ~Vcc of 6mg

## 2022-04-04 NOTE — PHYSICAL EXAM
[Left] : left knee [Right] : right knee [5___] : hamstring 5[unfilled]/5 [Bilateral] : knee bilaterally [AP] : anteroposterior [Lateral] : lateral [advanced tricompartmental OA with medial compartment narrowing and varus alignment] : advanced tricompartmental OA with medial compartment narrowing and varus alignment [] : negative Lachmann [[id number]] : flexion 105 degrees [de-identified] : extension 3 degrees

## 2022-04-21 ENCOUNTER — APPOINTMENT (OUTPATIENT)
Dept: ORTHOPEDIC SURGERY | Facility: CLINIC | Age: 77
End: 2022-04-21

## 2022-05-05 ENCOUNTER — APPOINTMENT (OUTPATIENT)
Dept: ORTHOPEDIC SURGERY | Facility: CLINIC | Age: 77
End: 2022-05-05
Payer: MEDICARE

## 2022-05-05 PROCEDURE — 99213 OFFICE O/P EST LOW 20 MIN: CPT

## 2022-05-05 NOTE — HISTORY OF PRESENT ILLNESS
[de-identified] : following up for bilateral knees. The patient had a CSI for the left knee 4/04/2022 which provided great relief. She states the left knee is feeling much better. She states the right knee pain has remained the same. Patient overall is happy with her Left knee.

## 2022-05-05 NOTE — DISCUSSION/SUMMARY
[de-identified] : SHe will consider CSI or lubricant injections in the future if the need arises.  She states the Right knee is doing pretty well at thisp oint.

## 2022-05-05 NOTE — PHYSICAL EXAM
[Left] : left knee [Right] : right knee [5___] : hamstring 5[unfilled]/5 [] : negative Lachmann [TWNoteComboBox7] : flexion 105 degrees [de-identified] : extension 3 degrees

## 2022-08-12 ENCOUNTER — RX RENEWAL (OUTPATIENT)
Age: 77
End: 2022-08-12

## 2022-09-13 ENCOUNTER — APPOINTMENT (OUTPATIENT)
Dept: CARDIOLOGY | Facility: CLINIC | Age: 77
End: 2022-09-13

## 2022-09-13 VITALS
HEIGHT: 66 IN | WEIGHT: 150 LBS | OXYGEN SATURATION: 98 % | TEMPERATURE: 97.3 F | BODY MASS INDEX: 24.11 KG/M2 | HEART RATE: 63 BPM | DIASTOLIC BLOOD PRESSURE: 84 MMHG | SYSTOLIC BLOOD PRESSURE: 146 MMHG

## 2022-09-13 PROCEDURE — 99214 OFFICE O/P EST MOD 30 MIN: CPT

## 2022-09-13 NOTE — HISTORY OF PRESENT ILLNESS
[FreeTextEntry1] : CAD: Patient is status post coronary stenting of the RCA.  She has significant disease of the left circumflex.  There is poststenotic dilatation.  The distal vessel is somewhat small.  The patient is asymptomatic.  She enjoys good exercise capacity without exertional symptoms.  Overall feel that continued observation is her best option.\par \par Cardiomyopathy: Transthoracic echocardiography in the past overall showed normal left ventricular function with  hypokinesis of the mid inferolateral wall.  Transthoracic echocardiography is indicated to assess her wall motion.\par \par Hyperlipidemia: Today we added Zetia 10 mg daily.  LFTs and cholesterol profile in the fasting state have been arranged for 6 weeks time.

## 2022-10-03 ENCOUNTER — RX CHANGE (OUTPATIENT)
Age: 77
End: 2022-10-03

## 2022-10-25 ENCOUNTER — APPOINTMENT (OUTPATIENT)
Dept: CARDIOLOGY | Facility: CLINIC | Age: 77
End: 2022-10-25

## 2022-10-25 PROCEDURE — 93306 TTE W/DOPPLER COMPLETE: CPT

## 2022-11-01 ENCOUNTER — APPOINTMENT (OUTPATIENT)
Dept: CARDIOLOGY | Facility: CLINIC | Age: 77
End: 2022-11-01

## 2022-11-01 VITALS
BODY MASS INDEX: 23.3 KG/M2 | OXYGEN SATURATION: 96 % | WEIGHT: 145 LBS | DIASTOLIC BLOOD PRESSURE: 76 MMHG | SYSTOLIC BLOOD PRESSURE: 140 MMHG | HEIGHT: 66 IN | HEART RATE: 70 BPM | TEMPERATURE: 96.9 F

## 2022-11-01 PROCEDURE — 99214 OFFICE O/P EST MOD 30 MIN: CPT

## 2022-11-01 NOTE — ASSESSMENT
[FreeTextEntry1] : CAD: Patient status post coronary stenting.  The patient shows excellent exercise capacity without exertional symptoms.\par \par Hyperlipidemia: The patient does not wish to consider injectable drugs at this time.  She will attempt to tighten up her diet and recheck her cholesterol profile in 3 months.\par \par Cardiomyopathy: Repeat echocardiography reveals normal left ventricular function.\par

## 2022-12-22 NOTE — PATIENT PROFILE ADULT. - FUNCTIONAL SCREEN CURRENT LEVEL: DRESSING, MLM
You can access the FollowMyHealth Patient Portal offered by [facility] by registering at the following website: [email or web address]. By joining [facility]’s FollowMyHealth portal, you will also be able to view your health information using other applications (apps) compatible with our system. (0) independent

## 2023-06-27 ENCOUNTER — APPOINTMENT (OUTPATIENT)
Dept: CARDIOLOGY | Facility: CLINIC | Age: 78
End: 2023-06-27
Payer: MEDICARE

## 2023-06-27 VITALS
HEART RATE: 70 BPM | BODY MASS INDEX: 24.59 KG/M2 | SYSTOLIC BLOOD PRESSURE: 154 MMHG | WEIGHT: 153 LBS | OXYGEN SATURATION: 96 % | DIASTOLIC BLOOD PRESSURE: 96 MMHG | HEIGHT: 66 IN

## 2023-06-27 DIAGNOSIS — I25.10 ATHEROSCLEROTIC HEART DISEASE OF NATIVE CORONARY ARTERY W/OUT ANGINA PECTORIS: ICD-10-CM

## 2023-06-27 DIAGNOSIS — I42.9 CARDIOMYOPATHY, UNSPECIFIED: ICD-10-CM

## 2023-06-27 PROCEDURE — 99212 OFFICE O/P EST SF 10 MIN: CPT

## 2023-06-27 NOTE — ASSESSMENT
[FreeTextEntry1] : CAD: The patient is status post coronary stenting.  The patient has excellent exercise capacity without exertional symptoms.\par \par Hyperlipidemia: Target LDL has not been achieved.  The risk minutes of injectable drugs have been reviewed.  The patient does not wish to partake in injectable drugs.\par \par Cardiomyopathy: Most recent assessment of left-ventricular function is normal.

## 2023-06-28 ENCOUNTER — NON-APPOINTMENT (OUTPATIENT)
Age: 78
End: 2023-06-28

## 2023-06-28 ENCOUNTER — RX RENEWAL (OUTPATIENT)
Age: 78
End: 2023-06-28

## 2023-07-20 ENCOUNTER — APPOINTMENT (OUTPATIENT)
Dept: ORTHOPEDIC SURGERY | Facility: CLINIC | Age: 78
End: 2023-07-20
Payer: MEDICARE

## 2023-07-20 VITALS — HEIGHT: 66 IN | BODY MASS INDEX: 24.59 KG/M2 | WEIGHT: 153 LBS

## 2023-07-20 PROCEDURE — 99214 OFFICE O/P EST MOD 30 MIN: CPT | Mod: 25

## 2023-07-20 PROCEDURE — 20610 DRAIN/INJ JOINT/BURSA W/O US: CPT | Mod: LT

## 2023-07-20 NOTE — PHYSICAL EXAM
[Left] : left knee [Right] : right knee [5___] : hamstring 5[unfilled]/5 [] : negative Lachmann [TWNoteComboBox7] : flexion 105 degrees [de-identified] : extension 3 degrees

## 2023-07-20 NOTE — DISCUSSION/SUMMARY
[de-identified] : Extensive discussion of the options was had with the patient. This discussion included both surgical and nonsurgical options. Options including but not limited to cortisone injection, viscosupplementation,  physical therapy, oral anti-inflammatories, MRI, arthroplasty and arthroscopy were discussed with the patient. We also discussed the option of observation, allowing the patient to continue rest, ice, NSAIDs and following up if the condition does not improve. Time was taken to go over any questions the patient had in regards to any of the treatment plans described.\par \par Large joint injection was performed of the LEFT knee.\par The indication for this procedure was pain, inflammation and x-ray evidence of Osteoarthritis on this or prior visit. The site was prepped with betadine and sterile technique used.An injection of Lidocaine 5cc of 1%  was used Betamethasone (Celestone) 1cc of 6mg. \par Patient tolerated procedure well. Patient was advised to call if redness, pain or fever occur and apply ice for 15 minutes out of every hour for the next 12-24 hours as tolerated. \par \par Patient has tried OTC's including aspirin, Ibuprofen, Aleve, etc or prescription NSAIDS, and/or exercises at home and/or physical therapy without satisfactory response, patient had decreased mobility in the joint and the risks benefits, and alternatives have been discussed, and verbal consent was obtained. \par \par Entered by Zoila Bird acting as scribe.\par Dr. Jasso Attestation\par The documentation recorded by the scribe, in my presence, accurately reflects the service I, Dr. Jasso, personally performed, and the decisions made by me with my edits as appropriate.

## 2023-08-17 ENCOUNTER — APPOINTMENT (OUTPATIENT)
Dept: ORTHOPEDIC SURGERY | Facility: CLINIC | Age: 78
End: 2023-08-17
Payer: MEDICARE

## 2023-08-17 DIAGNOSIS — M17.0 BILATERAL PRIMARY OSTEOARTHRITIS OF KNEE: ICD-10-CM

## 2023-08-17 PROCEDURE — 20610 DRAIN/INJ JOINT/BURSA W/O US: CPT | Mod: RT

## 2023-08-17 PROCEDURE — 99213 OFFICE O/P EST LOW 20 MIN: CPT | Mod: 25

## 2023-08-17 NOTE — DISCUSSION/SUMMARY
[de-identified] : Plan is for Right knee CSI and f/u in 1 mos of PRN.   Large joint injection was performed of the RIGHT   knee. The indication for this procedure was pain, inflammation and x-ray evidence of Osteoarthritis on this or prior visit. The site was prepped with betadine and sterile technique used.An injection of Lidocaine 5cc of 1%  was used Betamethasone (Celestone) 1cc of 6mg.  Patient tolerated procedure well. Patient was advised to call if redness, pain or fever occur and apply ice for 15 minutes out of every hour for the next 12-24 hours as tolerated.   Patient has tried OTC's including aspirin, Ibuprofen, Aleve, etc or prescription NSAIDS, and/or exercises at home and/or physical therapy without satisfactory response, patient had decreased mobility in the joint and the risks benefits, and alternatives have been discussed, and verbal consent was obtained.   Entered by Robert Siegel acting as scribe. Dr. Jasso Attestation The documentation recorded by the scribe, in my presence, accurately reflects the service I, Dr. Jasso, personally performed, and the decisions made by me with my edits as appropriate.

## 2023-08-17 NOTE — HISTORY OF PRESENT ILLNESS
[de-identified] : Patient had Left Knee CSi 7/20/23. Patient is here to have Rt Knee CSI as previously discussed at last appointment. Patient reports significant relief from the CSI in the Left knee at this time.

## 2023-08-17 NOTE — PHYSICAL EXAM
[Left] : left knee [Right] : right knee [5___] : hamstring 5[unfilled]/5 [] : negative Lachmann [[id number]] : flexion 110 degrees [de-identified] : extension 0 degrees

## 2023-08-31 ENCOUNTER — APPOINTMENT (OUTPATIENT)
Dept: CARDIOLOGY | Facility: CLINIC | Age: 78
End: 2023-08-31

## 2023-10-11 ENCOUNTER — RX RENEWAL (OUTPATIENT)
Age: 78
End: 2023-10-11

## 2023-10-11 RX ORDER — AMLODIPINE BESYLATE 5 MG/1
5 TABLET ORAL
Qty: 90 | Refills: 3 | Status: ACTIVE | COMMUNITY
Start: 2021-01-31 | End: 1900-01-01

## 2023-11-30 ENCOUNTER — RX RENEWAL (OUTPATIENT)
Age: 78
End: 2023-11-30

## 2023-11-30 RX ORDER — EZETIMIBE 10 MG/1
10 TABLET ORAL DAILY
Qty: 90 | Refills: 3 | Status: ACTIVE | COMMUNITY
Start: 2022-09-13 | End: 1900-01-01

## 2024-01-09 RX ORDER — ATORVASTATIN CALCIUM 80 MG/1
80 TABLET, FILM COATED ORAL
Qty: 90 | Refills: 2 | Status: ACTIVE | COMMUNITY
Start: 2020-09-07 | End: 1900-01-01

## 2024-01-25 NOTE — ASU PATIENT PROFILE, ADULT - NS SC CAGE [employer] EYE OPENER
Dear Player, DO Min    I saw Maribel Sue on 1/25/2024  in my clinic in follow up visit.    HISTORY OF PRESENT ILLNESS:    Maribel Sue is a pleasant 55 year old female here for neurology follow-up. Since her last visit she reports that her transient visual symptoms and dizzy spells have occurred more frequently. She reports that changes in the light, TV and her phone can trigger these episodes. She reports that she tries to look at a geometric shape on the wall until this passes. She reports that she is very anxious that these episodes will develop into vertigo. She continues to work at the [employer], and is concerned that her symptoms may impact her ability to do her job fully.    PAST MEDICAL HISTORY:      Abnormal PAP Smear                                              Comment: LGSIL    Bilateral knee pain                                           Weight gain                                                   Restless leg syndrome                                         Hypothyroidism                                                GERD (gastroesophageal reflux disease)                        Vertigo                                                         Comment: Pt. reports episodes of vertigo, most                frequently in winter and spring.  Takes her by                surprise with little warning-sometimes some                visual aura    Essential (primary) hypertension                              Migraine with vertigo                                         Ocular migraine                                               Fracture                                        05/2017         Comment: RIGHT RING FINGER    Diverticulosis of colon                         02/23/2023      Comment: dr. benítez    Colon polyp                                     10/31/2017      Comment: Dr. Benítez, Normal    Nausea after anesthesia                                       Stiffness of finger joint                        11/2017         Comment: S/P ORIF right ring finger    Depression                                                    Chronic pain                                                    Comment: hips    Colon polyp                                     02/23/2023      Comment: dr benítez    Internal hemorrhoids                            02/23/2023      Comment: dr. benítez    Past Surgical History:   Procedure Laterality Date    Arthroscopic rotator cuff Bilateral 12/2007    Bladder suspension      Colonoscopy  10/31/2017    dr benítez    Colonoscopy w/ polypectomy  02/23/2023    dr benítez    Colposcopy bx cervix endocerv curr      Colposcopy    Ercp  07/03/2016    WITH GENERAL ANESTHESIA    Fracture surgery Right 2017    ORIF of right ring finger    Hysterectomy  08/01/2021    Hysteroscopy endometrial ablation  04/16/2012    TVT and HTA in OR    Leg/ankle surgery proc unlisted Left 12/07/2023    left leg deep gastroc recession    Removal gallbladder  06/25/2016    Removal of tonsils,14+ y/o      Surg rx missed abortn,1st tri  08/2009    D&C       Social History     Socioeconomic History    Marital status: /Civil Union     Spouse name: Not on file    Number of children: 3    Years of education: Not on file    Highest education level: Not on file   Occupational History    Occupation:      Employer: [email or web address]   Tobacco Use    Smoking status: Never     Passive exposure: Never    Smokeless tobacco: Never   Vaping Use    Vaping Use: never used   Substance and Sexual Activity    Alcohol use: Yes     Alcohol/week: 1.0 standard drink of alcohol     Types: 1 Standard drinks or equivalent per week     Comment: month    Drug use: No    Sexual activity: Yes     Partners: Male     Comment: Vasectomy   Other Topics Concern     Service Not Asked    Blood Transfusions Not Asked    Caffeine Concern Not Asked    Occupational Exposure Not Asked    Hobby Hazards Not Asked    Sleep Concern Not Asked    Stress  Concern Not Asked    Weight Concern Not Asked    Special Diet Not Asked    Back Care Not Asked    Exercise No    Bike Helmet Not Asked    Seat Belt Yes    Self-Exams Yes   Social History Narrative    Pt is .    Non-smoker.     for [employer]     Social Determinants of Health     Financial Resource Strain: Not on file   Food Insecurity: Not on file   Transportation Needs: Not on file   Physical Activity: Not on file   Stress: Not on file   Social Connections: Not on file   Interpersonal Safety: Not At Risk (12/5/2023)    Interpersonal Safety     Social Determinants: Intimate Partner Violence Past Fear: No     Social Determinants: Intimate Partner Violence Current Fear: No       Family History   Problem Relation Age of Onset    Cervical cancer Mother         Cervical- pre cancer but was seeing an Ocncologest    Hypertension Mother     Diabetes Father     Obesity Father        ALLERGIES:   Allergen Reactions    Adhesive   (Environmental) RASH and Dermatitis     Skin seems to come off when bandages are removed. KT tape    Liraglutide -Weight Management Other (See Comments)     Exacerbated her vertigo SIGNIFICANTLY    Penicillins HIVES     12/6/2023 received cefazolin in 6/2017 without documentation of any adverse reaction in allergies. Earl Lovell, formerly [facility]        Current Outpatient Medications   Medication Sig Dispense Refill    fluconazole (DIFLUCAN) 150 MG tablet Take one tablet by mouth. If symptoms persist, repeat dose in 7 days. 2 tablet 0    cefdinir (OMNICEF) 300 MG capsule Take 1 capsule by mouth 2 times daily for 7 days. 14 capsule 0    estrogens conjugated (PREMARIN) vaginal cream Place 0.5 g vaginally 2 days a week. 30 g 1    ibuprofen (MOTRIN) 800 MG tablet Take 1 tablet by mouth every 8 hours as needed for Pain. 30 tablet 0    propRANolol (INDERAL LA) 60 MG 24 hr capsule Take 1 capsule by mouth daily. (Patient taking differently: Take 60 mg by mouth at bedtime.) 90 capsule 1     topiramate (Trokendi XR) 50 MG extended-release capsule Take 1 capsule by mouth daily. (Patient taking differently: Take 50 mg by mouth every afternoon.) 30 capsule 11    levothyroxine 100 MCG tablet Take 1 tablet by mouth daily. 90 tablet 1    ALPRAZolam (XANAX) 0.25 MG tablet Take 1 tablet by mouth nightly as needed for vertigo or anxiety. 30 tablet 0    ondansetron (ZOFRAN) 4 MG tablet Take 1 tablet by mouth every 8 hours as needed for Nausea. 20 tablet 3    rimegepant sulfate (Nurtec) 75 MG disintegrating tablet Take 1 tablet by mouth daily as needed for Migraine. Max 75 mg/24 hours. 8 tablet 11    albuterol 108 (90 Base) MCG/ACT inhaler Inhale 2 puffs into the lungs every 4 hours as needed for Shortness of Breath or Wheezing. 8.5 g 3     No current facility-administered medications for this visit.       GENERAL REVIEW OF SYSTEM:    Constitutional:  Denies fever or chills   Eyes:  Denies change in visual acuity or blurred vision   HENT:  Denies dysphagia  Respiratory:  Denies shortness of breath   Cardiovascular:  Denies chest pain or pedal edema   GI:  Denies abdominal pain, nausea, vomiting, constipation or diarrhea   :  Denies bladder dysfunction   Musculoskeletal:  Denies neck or low back pain   Neurologic:  Denies headache, focal weakness or sensory changes. Reports dizziness and mild gait imbalance  Psychiatric:  Denies depression or insomnia. Reports anxiety and difficulty sleeping at night    PHYSICAL EXAM:    Blood pressure 126/80, pulse 80, height 5' 4\" (1.626 m), last menstrual period 03/23/2012, SpO2 98 %, not currently breastfeeding..   The patient is well-nourished and well developed, in no acute distress.  HHENT: Normocephalic and atraumatic.  Neck: Supple, no JVD or carotid bruit.  Heart: Regular rate and rhythm, S1 and S2 heard  Lungs: Clear to auscultation.    NEUROLOGICAL EXAMINATION:    Mental Status: AAOx3. Speech was fluent and language was intact. Fund of knowledge normal. Recent and  remote memory intact. Affect and mood normal. Attention span and concentration normal.  Cranial Nerves:  Pupils were round, equal and reactive to light. Extra occular movements were intact. No nystagmus. Fundi were normal. Visual fields were full on confrontation. Face was symmetric. Facial sensations were intact. High frequency hearing was normal. Tongue was midline. Palate moved symmetrically. Other cranial nerves were intact.  Motor:  There was no drift. Muscle bulk, tone and strength was normal in all four extremities in both proximal and distal muscle groups.  Deep tendon reflexes were symmetric. Plantars were down-going.  Sensory:  Pinprick, temperature, vibration and position sensations were intact in all four extremities, slight decreased sensation of cold on RLE compared to LLE  Coordination: Finger to nose and heel to shin were normal. Romberg was negative.  Gait: Station and gait were normal.she was able to walk on her tip toes and heels without assistance.     LABORATORY RESULTS:    Lab Results   Component Value Date    SODIUM 139 11/27/2023    POTASSIUM 3.9 11/27/2023    GLUCOSE 92 11/27/2023    BUN 13 11/27/2023    CREATININE 0.99 (H) 11/27/2023    CALCIUM 8.7 11/27/2023    MG 2.1 10/24/2022    BILIRUBIN 0.4 11/27/2023    AST 15 11/27/2023    GPT 25 11/27/2023    ALBUMIN 3.3 (L) 11/27/2023    TSH 2.169 08/08/2023    PT 10.4 05/17/2022    INR 1.0 05/17/2022    PTT 26 05/17/2022    PHOS 2.5 06/24/2016    RBC 4.71 11/27/2023    WBC 8.9 11/27/2023    HGB 14.1 11/27/2023    HCT 42.4 11/27/2023     11/27/2023    RESR 14 10/18/2023    CRP 0.4 10/18/2023    ANABL NEGATIVE 05/12/2017     ASSESSMENT AND PLAN:    Vertigo- likely peripheral  Basilar migraine  Common migraine, intractable  Episodes of transient visual disturbance  5. Anxiety    Pathophysiology, workup and treatment options were discussed with the patient. She continues to have episodes of vertigo, dizziness and nearly daily transient visual  disturbance which causes her anxiety that she will develop vertigo. She has seen ENT, neuro ophthalmology and vestibular therapy without improvement. After discussing treatment options, will start her on Prozac 10 mg daily x 2 weeks then 20 mg thereafter for anxiety and see if this helps her symptoms. For now will continue Trokendi XR 50 mg daily and Propranolol for migraine prophylaxis. Will again discuss possibly starting CGRP-I injection at her next visit. She will follow-up in 4 months.    Thank you for the opportunity for participating in the care of this pleasant woman.    SAMINA Anderson     ==================================    Neurology attending note:  (incident to encounter)    I reviewed the chart and noted the events.  I interviewed the patient.  I rounded with nurse practitioner Maribel Stubbs and I agree with her documentation of subjective symptoms, medications, allergies, social history, family history, past medical/surgical history.  I formulated the treatment plan with my nurse practitioner and I agree with her recommendations.  I reviewed the notes from neuro-otologist, neuro-ophthalmologist and vestibular physical therapist.  She had tried Depakote in the past.  Various options for migraine prophylaxis were discussed.  He is also getting very anxious.  After discussing various options I am starting her on Prozac 10 mg daily for 2 weeks then 20 mg daily.  I will continue on Trokendi XR 50 mg daily.  I will decide about starting her on CGRP inhibitors injections for migraine prophylaxis.  He is going to follow up with me in 4 months.    Shaik Liam MD   no

## 2024-05-03 RX ORDER — METOPROLOL SUCCINATE 100 MG/1
100 TABLET, EXTENDED RELEASE ORAL
Qty: 90 | Refills: 1 | Status: ACTIVE | COMMUNITY
Start: 2020-08-24 | End: 1900-01-01

## 2024-06-27 ENCOUNTER — APPOINTMENT (OUTPATIENT)
Dept: CARDIOLOGY | Facility: CLINIC | Age: 79
End: 2024-06-27
Payer: MEDICARE

## 2024-06-27 VITALS
WEIGHT: 150 LBS | DIASTOLIC BLOOD PRESSURE: 92 MMHG | OXYGEN SATURATION: 97 % | HEART RATE: 84 BPM | SYSTOLIC BLOOD PRESSURE: 130 MMHG | BODY MASS INDEX: 24.21 KG/M2

## 2024-06-27 DIAGNOSIS — I10 ESSENTIAL (PRIMARY) HYPERTENSION: ICD-10-CM

## 2024-06-27 PROCEDURE — 99214 OFFICE O/P EST MOD 30 MIN: CPT

## 2024-10-26 ENCOUNTER — RX RENEWAL (OUTPATIENT)
Age: 79
End: 2024-10-26

## 2024-11-19 ENCOUNTER — RX RENEWAL (OUTPATIENT)
Age: 79
End: 2024-11-19

## 2024-12-25 PROBLEM — F10.90 ALCOHOL USE: Status: ACTIVE | Noted: 2018-11-30

## 2025-04-17 ENCOUNTER — RX RENEWAL (OUTPATIENT)
Age: 80
End: 2025-04-17

## 2025-06-12 ENCOUNTER — NON-APPOINTMENT (OUTPATIENT)
Age: 80
End: 2025-06-12

## 2025-06-12 ENCOUNTER — APPOINTMENT (OUTPATIENT)
Dept: CARDIOLOGY | Facility: CLINIC | Age: 80
End: 2025-06-12
Payer: MEDICARE

## 2025-06-12 VITALS
HEIGHT: 66 IN | HEART RATE: 77 BPM | SYSTOLIC BLOOD PRESSURE: 146 MMHG | BODY MASS INDEX: 23.3 KG/M2 | OXYGEN SATURATION: 95 % | DIASTOLIC BLOOD PRESSURE: 88 MMHG | WEIGHT: 145 LBS

## 2025-06-12 PROCEDURE — 93000 ELECTROCARDIOGRAM COMPLETE: CPT

## 2025-06-12 PROCEDURE — G2211 COMPLEX E/M VISIT ADD ON: CPT

## 2025-06-12 PROCEDURE — 99204 OFFICE O/P NEW MOD 45 MIN: CPT

## 2025-09-18 ENCOUNTER — RX RENEWAL (OUTPATIENT)
Age: 80
End: 2025-09-18